# Patient Record
Sex: MALE | Race: OTHER
[De-identification: names, ages, dates, MRNs, and addresses within clinical notes are randomized per-mention and may not be internally consistent; named-entity substitution may affect disease eponyms.]

---

## 2021-08-16 ENCOUNTER — HOSPITAL ENCOUNTER (OUTPATIENT)
Dept: HOSPITAL 49 - FER | Age: 26
Discharge: HOME | End: 2021-08-16
Attending: STUDENT IN AN ORGANIZED HEALTH CARE EDUCATION/TRAINING PROGRAM
Payer: COMMERCIAL

## 2021-08-16 VITALS — HEIGHT: 69 IN | WEIGHT: 189.99 LBS | BODY MASS INDEX: 28.14 KG/M2

## 2021-08-16 DIAGNOSIS — K35.30: Primary | ICD-10-CM

## 2021-08-16 DIAGNOSIS — Z20.822: ICD-10-CM

## 2021-08-16 LAB
ALBUMIN SERPL-MCNC: 4.2 G/DL (ref 3.4–5)
ALKALINE PHOSHATASE: 75 U/L (ref 46–116)
ALT SERPL-CCNC: 31 U/L (ref 16–63)
AST: 22 U/L (ref 15–37)
BASOPHIL: 0.4 % (ref 0–2)
BILIRUBIN - TOTAL: 0.5 MG/DL (ref 0.2–1)
BILIRUBIN: NEGATIVE MG/DL
BLOOD: NEGATIVE ERY/UL
BUN SERPL-MCNC: 12 MG/DL (ref 7–18)
BUN/CREAT RATIO (CALC): 15.6 RATIO
CHLORIDE: 102 MMOL/L (ref 98–107)
CLARITY UR: CLEAR
CO2 (BICARBONATE): 28 MMOL/L (ref 21–32)
COLOR: YELLOW
CREATININE: 0.77 MG/DL (ref 0.67–1.17)
EOSINOPHIL: 0.2 % (ref 0–5)
GLOBULIN (CALCULATION): 3.2 G/DL
GLUCOSE (U): NORMAL MG/DL
GLUCOSE SERPL-MCNC: 115 MG/DL (ref 74–106)
HCT: 46.3 % (ref 42–52)
HGB BLD-MCNC: 15.7 G/DL (ref 13.2–18)
LEUKOCYTES: NEGATIVE LEU/UL
LYMPHOCYTE: 7.6 % (ref 15–48)
MCH RBC QN AUTO: 27.8 PG (ref 25–31)
MCHC RBC AUTO-ENTMCNC: 33.9 G/DL (ref 32–36)
MCV: 82.1 FL (ref 78–100)
MONOCYTE: 4.5 % (ref 0–12)
MPV: 9.7 FL (ref 6–9.5)
NEUTROPHIL: 87.1 % (ref 41–80)
NITRITE: NEGATIVE MG/DL
NRBC: 0
PLT: 235 K/UL (ref 150–400)
POTASSIUM: 3.9 MMOL/L (ref 3.5–5.1)
PROTEIN: NEGATIVE MG/DL
RBC MORPHOLOGY: NORMAL
RBC: 5.64 M/UL (ref 4.7–6)
RDW: 11.9 % (ref 11.5–14)
SPECIFIC GRAVITY: 1.01 (ref 1–1.03)
TOTAL PROTEIN: 7.4 G/DL (ref 6.4–8.2)
UROBILINOGEN: 1 MG/DL (ref 0.2–1)
WBC: 9.1 K/UL (ref 4–10.5)

## 2021-08-16 PROCEDURE — U0002 COVID-19 LAB TEST NON-CDC: HCPCS

## 2021-08-17 NOTE — NUR
Mr. Stoen is a  from [place]. He was traveling through [place]
when he experienced abdominal pain. He had an appendectomy on 8/16/21. Mr. Ribera has funds. He was assisted in booking a room ar [facility]. A cab
voucher was provide to the Pharmacy and hotel. darlin Salinas supervisor
approved for Mr. Maharaj to leave his semi in the parking lot until he he able
to get on the road again. This is expected to be Wednesday.